# Patient Record
Sex: MALE | Race: BLACK OR AFRICAN AMERICAN | NOT HISPANIC OR LATINO | URBAN - METROPOLITAN AREA
[De-identification: names, ages, dates, MRNs, and addresses within clinical notes are randomized per-mention and may not be internally consistent; named-entity substitution may affect disease eponyms.]

---

## 2017-04-04 ENCOUNTER — OFFICE (OUTPATIENT)
Dept: URBAN - METROPOLITAN AREA CLINIC 14 | Facility: CLINIC | Age: 72
End: 2017-04-04
Payer: MEDICARE

## 2017-04-04 VITALS
HEART RATE: 90 BPM | DIASTOLIC BLOOD PRESSURE: 84 MMHG | HEIGHT: 71 IN | WEIGHT: 170 LBS | SYSTOLIC BLOOD PRESSURE: 122 MMHG

## 2017-04-04 DIAGNOSIS — I10 ESSENTIAL (PRIMARY) HYPERTENSION: ICD-10-CM

## 2017-04-04 DIAGNOSIS — E66.3 OVERWEIGHT: ICD-10-CM

## 2017-04-04 DIAGNOSIS — E11.9 TYPE 2 DIABETES MELLITUS WITHOUT COMPLICATIONS: ICD-10-CM

## 2017-04-04 DIAGNOSIS — R74.8 ABNORMAL LEVELS OF OTHER SERUM ENZYMES: ICD-10-CM

## 2017-04-04 PROCEDURE — 99213 OFFICE O/P EST LOW 20 MIN: CPT | Performed by: INTERNAL MEDICINE

## 2017-04-04 PROCEDURE — G8427 DOCREV CUR MEDS BY ELIG CLIN: HCPCS | Performed by: INTERNAL MEDICINE

## 2017-05-10 ENCOUNTER — ON CAMPUS - OUTPATIENT (OUTPATIENT)
Dept: URBAN - METROPOLITAN AREA HOSPITAL 97 | Facility: HOSPITAL | Age: 72
End: 2017-05-10

## 2017-05-10 DIAGNOSIS — K83.8 OTHER SPECIFIED DISEASES OF BILIARY TRACT: ICD-10-CM

## 2017-05-10 DIAGNOSIS — K83.1 OBSTRUCTION OF BILE DUCT: ICD-10-CM

## 2017-05-10 DIAGNOSIS — K86.1 OTHER CHRONIC PANCREATITIS: ICD-10-CM

## 2017-05-10 DIAGNOSIS — R94.5 ABNORMAL RESULTS OF LIVER FUNCTION STUDIES: ICD-10-CM

## 2017-05-10 PROCEDURE — 43274 ERCP DUCT STENT PLACEMENT: CPT | Performed by: INTERNAL MEDICINE

## 2017-08-01 ENCOUNTER — ON CAMPUS - OUTPATIENT (OUTPATIENT)
Dept: URBAN - METROPOLITAN AREA HOSPITAL 97 | Facility: HOSPITAL | Age: 72
End: 2017-08-01

## 2017-08-01 DIAGNOSIS — K83.8 OTHER SPECIFIED DISEASES OF BILIARY TRACT: ICD-10-CM

## 2017-08-01 DIAGNOSIS — K83.1 OBSTRUCTION OF BILE DUCT: ICD-10-CM

## 2017-08-01 DIAGNOSIS — R94.5 ABNORMAL RESULTS OF LIVER FUNCTION STUDIES: ICD-10-CM

## 2017-08-01 PROCEDURE — 43274 ERCP DUCT STENT PLACEMENT: CPT | Performed by: INTERNAL MEDICINE

## 2017-09-13 ENCOUNTER — OFFICE (OUTPATIENT)
Dept: URBAN - METROPOLITAN AREA CLINIC 19 | Facility: CLINIC | Age: 72
End: 2017-09-13
Payer: MEDICARE

## 2017-09-13 VITALS
SYSTOLIC BLOOD PRESSURE: 124 MMHG | HEIGHT: 71 IN | DIASTOLIC BLOOD PRESSURE: 83 MMHG | WEIGHT: 171 LBS | HEART RATE: 98 BPM

## 2017-09-13 DIAGNOSIS — K86.1 OTHER CHRONIC PANCREATITIS: ICD-10-CM

## 2017-09-13 DIAGNOSIS — K83.1 OBSTRUCTION OF BILE DUCT: ICD-10-CM

## 2017-09-13 LAB
COMP. METABOLIC PANEL (14): A/G RATIO: 1.3 (ref 1.2–2.2)
COMP. METABOLIC PANEL (14): ALBUMIN, SERUM: 4 G/DL (ref 3.5–4.8)
COMP. METABOLIC PANEL (14): ALKALINE PHOSPHATASE, S: 242 IU/L — HIGH (ref 39–117)
COMP. METABOLIC PANEL (14): ALT (SGPT): 33 IU/L (ref 0–44)
COMP. METABOLIC PANEL (14): AST (SGOT): 30 IU/L (ref 0–40)
COMP. METABOLIC PANEL (14): BILIRUBIN, TOTAL: 0.5 MG/DL (ref 0–1.2)
COMP. METABOLIC PANEL (14): BUN/CREATININE RATIO: 15 (ref 10–24)
COMP. METABOLIC PANEL (14): BUN: 15 MG/DL (ref 8–27)
COMP. METABOLIC PANEL (14): CALCIUM, SERUM: 9.3 MG/DL (ref 8.6–10.2)
COMP. METABOLIC PANEL (14): CARBON DIOXIDE, TOTAL: 22 MMOL/L (ref 18–29)
COMP. METABOLIC PANEL (14): CHLORIDE, SERUM: 104 MMOL/L (ref 96–106)
COMP. METABOLIC PANEL (14): CREATININE, SERUM: 0.98 MG/DL (ref 0.76–1.27)
COMP. METABOLIC PANEL (14): EGFR IF AFRICN AM: 89 ML/MIN/1.73 (ref 59–?)
COMP. METABOLIC PANEL (14): EGFR IF NONAFRICN AM: 77 ML/MIN/1.73 (ref 59–?)
COMP. METABOLIC PANEL (14): GLOBULIN, TOTAL: 3.2 G/DL (ref 1.5–4.5)
COMP. METABOLIC PANEL (14): GLUCOSE, SERUM: 120 MG/DL — HIGH (ref 65–99)
COMP. METABOLIC PANEL (14): POTASSIUM, SERUM: 5 MMOL/L (ref 3.5–5.2)
COMP. METABOLIC PANEL (14): PROTEIN, TOTAL, SERUM: 7.2 G/DL (ref 6–8.5)
COMP. METABOLIC PANEL (14): SODIUM, SERUM: 143 MMOL/L (ref 134–144)
IGG, SUBCLASS 4: 819 MG/DL — HIGH (ref 2–96)

## 2017-09-13 PROCEDURE — G8427 DOCREV CUR MEDS BY ELIG CLIN: HCPCS | Performed by: INTERNAL MEDICINE

## 2017-09-13 PROCEDURE — 99212 OFFICE O/P EST SF 10 MIN: CPT | Performed by: INTERNAL MEDICINE

## 2017-10-23 ENCOUNTER — ON CAMPUS - OUTPATIENT (OUTPATIENT)
Dept: URBAN - METROPOLITAN AREA HOSPITAL 97 | Facility: HOSPITAL | Age: 72
End: 2017-10-23

## 2017-10-23 DIAGNOSIS — K83.1 OBSTRUCTION OF BILE DUCT: ICD-10-CM

## 2017-10-23 DIAGNOSIS — R94.5 ABNORMAL RESULTS OF LIVER FUNCTION STUDIES: ICD-10-CM

## 2017-10-23 DIAGNOSIS — K83.8 OTHER SPECIFIED DISEASES OF BILIARY TRACT: ICD-10-CM

## 2017-10-23 PROCEDURE — 43275 ERCP REMOVE FORGN BODY DUCT: CPT | Mod: 51 | Performed by: INTERNAL MEDICINE

## 2017-10-23 PROCEDURE — 43277 ERCP EA DUCT/AMPULLA DILATE: CPT | Performed by: INTERNAL MEDICINE

## 2018-01-24 ENCOUNTER — OFFICE (OUTPATIENT)
Dept: URBAN - METROPOLITAN AREA CLINIC 19 | Facility: CLINIC | Age: 73
End: 2018-01-24

## 2018-01-24 VITALS
SYSTOLIC BLOOD PRESSURE: 143 MMHG | HEART RATE: 93 BPM | HEIGHT: 71 IN | WEIGHT: 178 LBS | DIASTOLIC BLOOD PRESSURE: 88 MMHG

## 2018-01-24 DIAGNOSIS — K85.82 OTHER ACUTE PANCREATITIS WITH INFECTED NECROSIS: ICD-10-CM

## 2018-01-24 LAB
CBC WITH DIFFERENTIAL/PLATELET: BASO (ABSOLUTE): 0 X10E3/UL (ref 0–0.2)
CBC WITH DIFFERENTIAL/PLATELET: BASOS: 0 %
CBC WITH DIFFERENTIAL/PLATELET: EOS (ABSOLUTE): 0.1 X10E3/UL (ref 0–0.4)
CBC WITH DIFFERENTIAL/PLATELET: EOS: 1 %
CBC WITH DIFFERENTIAL/PLATELET: HEMATOCRIT: 46.1 % (ref 37.5–51)
CBC WITH DIFFERENTIAL/PLATELET: HEMOGLOBIN: 15.3 G/DL (ref 13–17.7)
CBC WITH DIFFERENTIAL/PLATELET: IMMATURE GRANS (ABS): 0 X10E3/UL (ref 0–0.1)
CBC WITH DIFFERENTIAL/PLATELET: IMMATURE GRANULOCYTES: 0 %
CBC WITH DIFFERENTIAL/PLATELET: LYMPHS (ABSOLUTE): 1.3 X10E3/UL (ref 0.7–3.1)
CBC WITH DIFFERENTIAL/PLATELET: LYMPHS: 26 %
CBC WITH DIFFERENTIAL/PLATELET: MCH: 31.6 PG (ref 26.6–33)
CBC WITH DIFFERENTIAL/PLATELET: MCHC: 33.2 G/DL (ref 31.5–35.7)
CBC WITH DIFFERENTIAL/PLATELET: MCV: 95 FL (ref 79–97)
CBC WITH DIFFERENTIAL/PLATELET: MONOCYTES(ABSOLUTE): 0.3 X10E3/UL (ref 0.1–0.9)
CBC WITH DIFFERENTIAL/PLATELET: MONOCYTES: 6 %
CBC WITH DIFFERENTIAL/PLATELET: NEUTROPHILS (ABSOLUTE): 3.2 X10E3/UL (ref 1.4–7)
CBC WITH DIFFERENTIAL/PLATELET: NEUTROPHILS: 67 %
CBC WITH DIFFERENTIAL/PLATELET: PLATELETS: 143 X10E3/UL — LOW (ref 150–379)
CBC WITH DIFFERENTIAL/PLATELET: RBC: 4.84 X10E6/UL (ref 4.14–5.8)
CBC WITH DIFFERENTIAL/PLATELET: RDW: 15.4 % (ref 12.3–15.4)
CBC WITH DIFFERENTIAL/PLATELET: WBC: 4.8 X10E3/UL (ref 3.4–10.8)
COMP. METABOLIC PANEL (14): A/G RATIO: 1.3 (ref 1.2–2.2)
COMP. METABOLIC PANEL (14): ALBUMIN, SERUM: 4.1 G/DL (ref 3.5–4.8)
COMP. METABOLIC PANEL (14): ALKALINE PHOSPHATASE, S: 161 IU/L — HIGH (ref 39–117)
COMP. METABOLIC PANEL (14): ALT (SGPT): 13 IU/L (ref 0–44)
COMP. METABOLIC PANEL (14): AST (SGOT): 17 IU/L (ref 0–40)
COMP. METABOLIC PANEL (14): BILIRUBIN, TOTAL: 0.5 MG/DL (ref 0–1.2)
COMP. METABOLIC PANEL (14): BUN/CREATININE RATIO: 13 (ref 10–24)
COMP. METABOLIC PANEL (14): BUN: 15 MG/DL (ref 8–27)
COMP. METABOLIC PANEL (14): CALCIUM, SERUM: 9.1 MG/DL (ref 8.6–10.2)
COMP. METABOLIC PANEL (14): CARBON DIOXIDE, TOTAL: 21 MMOL/L (ref 18–29)
COMP. METABOLIC PANEL (14): CHLORIDE, SERUM: 106 MMOL/L (ref 96–106)
COMP. METABOLIC PANEL (14): CREATININE, SERUM: 1.14 MG/DL (ref 0.76–1.27)
COMP. METABOLIC PANEL (14): EGFR IF AFRICN AM: 74 ML/MIN/1.73 (ref 59–?)
COMP. METABOLIC PANEL (14): EGFR IF NONAFRICN AM: 64 ML/MIN/1.73 (ref 59–?)
COMP. METABOLIC PANEL (14): GLOBULIN, TOTAL: 3.2 G/DL (ref 1.5–4.5)
COMP. METABOLIC PANEL (14): GLUCOSE, SERUM: 125 MG/DL — HIGH (ref 65–99)
COMP. METABOLIC PANEL (14): POTASSIUM, SERUM: 4.9 MMOL/L (ref 3.5–5.2)
COMP. METABOLIC PANEL (14): PROTEIN, TOTAL, SERUM: 7.3 G/DL (ref 6–8.5)
COMP. METABOLIC PANEL (14): SODIUM, SERUM: 143 MMOL/L (ref 134–144)

## 2018-01-24 PROCEDURE — 99212 OFFICE O/P EST SF 10 MIN: CPT | Performed by: INTERNAL MEDICINE

## 2018-01-24 RX ORDER — AZATHIOPRINE 50 1/1
TABLET ORAL
Qty: 180 | Refills: 0 | Status: COMPLETED
End: 2023-11-15

## 2018-07-25 ENCOUNTER — OFFICE (OUTPATIENT)
Dept: URBAN - METROPOLITAN AREA CLINIC 19 | Facility: CLINIC | Age: 73
End: 2018-07-25

## 2018-07-25 VITALS
HEIGHT: 71 IN | DIASTOLIC BLOOD PRESSURE: 86 MMHG | HEART RATE: 101 BPM | WEIGHT: 176 LBS | SYSTOLIC BLOOD PRESSURE: 123 MMHG

## 2018-07-25 DIAGNOSIS — K85.82 OTHER ACUTE PANCREATITIS WITH INFECTED NECROSIS: ICD-10-CM

## 2018-07-25 LAB
C-REACTIVE PROTEIN, QUANT: 2.3 MG/L (ref 0–4.9)
CBC WITH DIFFERENTIAL/PLATELET: BASO (ABSOLUTE): 0 X10E3/UL (ref 0–0.2)
CBC WITH DIFFERENTIAL/PLATELET: BASOS: 0 %
CBC WITH DIFFERENTIAL/PLATELET: EOS (ABSOLUTE): 0.1 X10E3/UL (ref 0–0.4)
CBC WITH DIFFERENTIAL/PLATELET: EOS: 1 %
CBC WITH DIFFERENTIAL/PLATELET: HEMATOCRIT: 42.6 % (ref 37.5–51)
CBC WITH DIFFERENTIAL/PLATELET: HEMOGLOBIN: 14.5 G/DL (ref 13–17.7)
CBC WITH DIFFERENTIAL/PLATELET: IMMATURE GRANS (ABS): 0 X10E3/UL (ref 0–0.1)
CBC WITH DIFFERENTIAL/PLATELET: IMMATURE GRANULOCYTES: 0 %
CBC WITH DIFFERENTIAL/PLATELET: LYMPHS (ABSOLUTE): 1.4 X10E3/UL (ref 0.7–3.1)
CBC WITH DIFFERENTIAL/PLATELET: LYMPHS: 30 %
CBC WITH DIFFERENTIAL/PLATELET: MCH: 32.6 PG (ref 26.6–33)
CBC WITH DIFFERENTIAL/PLATELET: MCHC: 34 G/DL (ref 31.5–35.7)
CBC WITH DIFFERENTIAL/PLATELET: MCV: 96 FL (ref 79–97)
CBC WITH DIFFERENTIAL/PLATELET: MONOCYTES(ABSOLUTE): 0.4 X10E3/UL (ref 0.1–0.9)
CBC WITH DIFFERENTIAL/PLATELET: MONOCYTES: 8 %
CBC WITH DIFFERENTIAL/PLATELET: NEUTROPHILS (ABSOLUTE): 2.7 X10E3/UL (ref 1.4–7)
CBC WITH DIFFERENTIAL/PLATELET: NEUTROPHILS: 61 %
CBC WITH DIFFERENTIAL/PLATELET: PLATELETS: 147 X10E3/UL — LOW (ref 150–379)
CBC WITH DIFFERENTIAL/PLATELET: RBC: 4.45 X10E6/UL (ref 4.14–5.8)
CBC WITH DIFFERENTIAL/PLATELET: RDW: 15.9 % — HIGH (ref 12.3–15.4)
CBC WITH DIFFERENTIAL/PLATELET: WBC: 4.5 X10E3/UL (ref 3.4–10.8)
COMP. METABOLIC PANEL (14): A/G RATIO: 1.6 (ref 1.2–2.2)
COMP. METABOLIC PANEL (14): ALBUMIN: 3.9 G/DL (ref 3.5–4.8)
COMP. METABOLIC PANEL (14): ALKALINE PHOSPHATASE: 109 IU/L (ref 39–117)
COMP. METABOLIC PANEL (14): ALT (SGPT): 10 IU/L (ref 0–44)
COMP. METABOLIC PANEL (14): AST (SGOT): 13 IU/L (ref 0–40)
COMP. METABOLIC PANEL (14): BILIRUBIN, TOTAL: 0.3 MG/DL (ref 0–1.2)
COMP. METABOLIC PANEL (14): BUN/CREATININE RATIO: 14 (ref 10–24)
COMP. METABOLIC PANEL (14): BUN: 15 MG/DL (ref 8–27)
COMP. METABOLIC PANEL (14): CALCIUM: 8.8 MG/DL (ref 8.6–10.2)
COMP. METABOLIC PANEL (14): CARBON DIOXIDE, TOTAL: 22 MMOL/L (ref 20–29)
COMP. METABOLIC PANEL (14): CHLORIDE: 108 MMOL/L — HIGH (ref 96–106)
COMP. METABOLIC PANEL (14): CREATININE: 1.11 MG/DL (ref 0.76–1.27)
COMP. METABOLIC PANEL (14): EGFR IF AFRICN AM: 76 ML/MIN/1.73 (ref 59–?)
COMP. METABOLIC PANEL (14): EGFR IF NONAFRICN AM: 66 ML/MIN/1.73 (ref 59–?)
COMP. METABOLIC PANEL (14): GLOBULIN, TOTAL: 2.4 G/DL (ref 1.5–4.5)
COMP. METABOLIC PANEL (14): GLUCOSE: 149 MG/DL — HIGH (ref 65–99)
COMP. METABOLIC PANEL (14): POTASSIUM: 4.3 MMOL/L (ref 3.5–5.2)
COMP. METABOLIC PANEL (14): PROTEIN, TOTAL: 6.3 G/DL (ref 6–8.5)
COMP. METABOLIC PANEL (14): SODIUM: 145 MMOL/L — HIGH (ref 134–144)
LIPASE: 48 U/L (ref 13–78)

## 2018-07-25 PROCEDURE — 99212 OFFICE O/P EST SF 10 MIN: CPT | Performed by: INTERNAL MEDICINE

## 2018-07-25 RX ORDER — AZATHIOPRINE 50 1/1
TABLET ORAL
Qty: 180 | Refills: 0 | Status: COMPLETED
End: 2023-11-15

## 2019-02-08 ENCOUNTER — OFFICE (OUTPATIENT)
Dept: URBAN - METROPOLITAN AREA CLINIC 19 | Facility: CLINIC | Age: 74
End: 2019-02-08

## 2019-02-08 VITALS
HEART RATE: 99 BPM | DIASTOLIC BLOOD PRESSURE: 95 MMHG | WEIGHT: 182 LBS | SYSTOLIC BLOOD PRESSURE: 135 MMHG | HEIGHT: 71 IN

## 2019-02-08 DIAGNOSIS — K85.82 OTHER ACUTE PANCREATITIS WITH INFECTED NECROSIS: ICD-10-CM

## 2019-02-08 LAB
CBC WITH DIFFERENTIAL/PLATELET: BASO (ABSOLUTE): 0 X10E3/UL (ref 0–0.2)
CBC WITH DIFFERENTIAL/PLATELET: BASOS: 0 %
CBC WITH DIFFERENTIAL/PLATELET: EOS (ABSOLUTE): 0.1 X10E3/UL (ref 0–0.4)
CBC WITH DIFFERENTIAL/PLATELET: EOS: 1 %
CBC WITH DIFFERENTIAL/PLATELET: HEMATOCRIT: 44.9 % (ref 37.5–51)
CBC WITH DIFFERENTIAL/PLATELET: HEMOGLOBIN: 15 G/DL (ref 13–17.7)
CBC WITH DIFFERENTIAL/PLATELET: IMMATURE GRANS (ABS): 0 X10E3/UL (ref 0–0.1)
CBC WITH DIFFERENTIAL/PLATELET: IMMATURE GRANULOCYTES: 0 %
CBC WITH DIFFERENTIAL/PLATELET: LYMPHS (ABSOLUTE): 0.9 X10E3/UL (ref 0.7–3.1)
CBC WITH DIFFERENTIAL/PLATELET: LYMPHS: 16 %
CBC WITH DIFFERENTIAL/PLATELET: MCH: 32.1 PG (ref 26.6–33)
CBC WITH DIFFERENTIAL/PLATELET: MCHC: 33.4 G/DL (ref 31.5–35.7)
CBC WITH DIFFERENTIAL/PLATELET: MCV: 96 FL (ref 79–97)
CBC WITH DIFFERENTIAL/PLATELET: MONOCYTES(ABSOLUTE): 0.4 X10E3/UL (ref 0.1–0.9)
CBC WITH DIFFERENTIAL/PLATELET: MONOCYTES: 7 %
CBC WITH DIFFERENTIAL/PLATELET: NEUTROPHILS (ABSOLUTE): 4.3 X10E3/UL (ref 1.4–7)
CBC WITH DIFFERENTIAL/PLATELET: NEUTROPHILS: 76 %
CBC WITH DIFFERENTIAL/PLATELET: PLATELETS: 151 X10E3/UL (ref 150–379)
CBC WITH DIFFERENTIAL/PLATELET: RBC: 4.67 X10E6/UL (ref 4.14–5.8)
CBC WITH DIFFERENTIAL/PLATELET: RDW: 15 % (ref 12.3–15.4)
CBC WITH DIFFERENTIAL/PLATELET: WBC: 5.7 X10E3/UL (ref 3.4–10.8)
COMP. METABOLIC PANEL (14): A/G RATIO: 1.4 (ref 1.2–2.2)
COMP. METABOLIC PANEL (14): ALBUMIN: 4.1 G/DL (ref 3.5–4.8)
COMP. METABOLIC PANEL (14): ALKALINE PHOSPHATASE: 109 IU/L (ref 39–117)
COMP. METABOLIC PANEL (14): ALT (SGPT): 6 IU/L (ref 0–44)
COMP. METABOLIC PANEL (14): AST (SGOT): 12 IU/L (ref 0–40)
COMP. METABOLIC PANEL (14): BILIRUBIN, TOTAL: 0.4 MG/DL (ref 0–1.2)
COMP. METABOLIC PANEL (14): BUN/CREATININE RATIO: 14 (ref 10–24)
COMP. METABOLIC PANEL (14): BUN: 15 MG/DL (ref 8–27)
COMP. METABOLIC PANEL (14): CALCIUM: 8.8 MG/DL (ref 8.6–10.2)
COMP. METABOLIC PANEL (14): CARBON DIOXIDE, TOTAL: 23 MMOL/L (ref 20–29)
COMP. METABOLIC PANEL (14): CHLORIDE: 106 MMOL/L (ref 96–106)
COMP. METABOLIC PANEL (14): CREATININE: 1.08 MG/DL (ref 0.76–1.27)
COMP. METABOLIC PANEL (14): EGFR IF AFRICN AM: 79 ML/MIN/1.73 (ref 59–?)
COMP. METABOLIC PANEL (14): EGFR IF NONAFRICN AM: 68 ML/MIN/1.73 (ref 59–?)
COMP. METABOLIC PANEL (14): GLOBULIN, TOTAL: 2.9 G/DL (ref 1.5–4.5)
COMP. METABOLIC PANEL (14): GLUCOSE: 160 MG/DL — HIGH (ref 65–99)
COMP. METABOLIC PANEL (14): POTASSIUM: 4.5 MMOL/L (ref 3.5–5.2)
COMP. METABOLIC PANEL (14): PROTEIN, TOTAL: 7 G/DL (ref 6–8.5)
COMP. METABOLIC PANEL (14): SODIUM: 144 MMOL/L (ref 134–144)
LIPASE: 57 U/L (ref 13–78)

## 2019-02-08 PROCEDURE — 99212 OFFICE O/P EST SF 10 MIN: CPT | Performed by: INTERNAL MEDICINE

## 2019-02-08 RX ORDER — AZATHIOPRINE 50 1/1
TABLET ORAL
Qty: 180 | Refills: 0 | Status: COMPLETED
End: 2023-11-15

## 2019-08-12 ENCOUNTER — OFFICE (OUTPATIENT)
Dept: URBAN - METROPOLITAN AREA CLINIC 19 | Facility: CLINIC | Age: 74
End: 2019-08-12

## 2019-08-12 VITALS
WEIGHT: 175 LBS | DIASTOLIC BLOOD PRESSURE: 68 MMHG | HEART RATE: 91 BPM | SYSTOLIC BLOOD PRESSURE: 123 MMHG | HEIGHT: 71 IN

## 2019-08-12 DIAGNOSIS — K85.80 OTHER ACUTE PANCREATITIS WITHOUT NECROSIS OR INFECTION: ICD-10-CM

## 2019-08-12 PROCEDURE — 99213 OFFICE O/P EST LOW 20 MIN: CPT | Performed by: INTERNAL MEDICINE

## 2020-02-12 ENCOUNTER — OFFICE (OUTPATIENT)
Dept: URBAN - METROPOLITAN AREA CLINIC 19 | Facility: CLINIC | Age: 75
End: 2020-02-12

## 2020-02-12 VITALS
WEIGHT: 178 LBS | HEART RATE: 101 BPM | HEIGHT: 71 IN | SYSTOLIC BLOOD PRESSURE: 141 MMHG | DIASTOLIC BLOOD PRESSURE: 85 MMHG

## 2020-02-12 DIAGNOSIS — K85.82 OTHER ACUTE PANCREATITIS WITH INFECTED NECROSIS: ICD-10-CM

## 2020-02-12 LAB
C-REACTIVE PROTEIN, QUANT: 1 MG/L (ref 0–10)
CBC WITH DIFFERENTIAL/PLATELET: BASO (ABSOLUTE): 0 X10E3/UL (ref 0–0.2)
CBC WITH DIFFERENTIAL/PLATELET: BASOS: 0 %
CBC WITH DIFFERENTIAL/PLATELET: EOS (ABSOLUTE): 0 X10E3/UL (ref 0–0.4)
CBC WITH DIFFERENTIAL/PLATELET: EOS: 1 %
CBC WITH DIFFERENTIAL/PLATELET: HEMATOCRIT: 45.6 % (ref 37.5–51)
CBC WITH DIFFERENTIAL/PLATELET: HEMOGLOBIN: 14.8 G/DL (ref 13–17.7)
CBC WITH DIFFERENTIAL/PLATELET: IMMATURE GRANS (ABS): 0 X10E3/UL (ref 0–0.1)
CBC WITH DIFFERENTIAL/PLATELET: IMMATURE GRANULOCYTES: 0 %
CBC WITH DIFFERENTIAL/PLATELET: LYMPHS (ABSOLUTE): 1.1 X10E3/UL (ref 0.7–3.1)
CBC WITH DIFFERENTIAL/PLATELET: LYMPHS: 26 %
CBC WITH DIFFERENTIAL/PLATELET: MCH: 32 PG (ref 26.6–33)
CBC WITH DIFFERENTIAL/PLATELET: MCHC: 32.5 G/DL (ref 31.5–35.7)
CBC WITH DIFFERENTIAL/PLATELET: MCV: 99 FL — HIGH (ref 79–97)
CBC WITH DIFFERENTIAL/PLATELET: MONOCYTES(ABSOLUTE): 0.3 X10E3/UL (ref 0.1–0.9)
CBC WITH DIFFERENTIAL/PLATELET: MONOCYTES: 6 %
CBC WITH DIFFERENTIAL/PLATELET: NEUTROPHILS (ABSOLUTE): 3 X10E3/UL (ref 1.4–7)
CBC WITH DIFFERENTIAL/PLATELET: NEUTROPHILS: 67 %
CBC WITH DIFFERENTIAL/PLATELET: PLATELETS: 172 X10E3/UL (ref 150–450)
CBC WITH DIFFERENTIAL/PLATELET: RBC: 4.63 X10E6/UL (ref 4.14–5.8)
CBC WITH DIFFERENTIAL/PLATELET: RDW: 14.5 % (ref 11.6–15.4)
CBC WITH DIFFERENTIAL/PLATELET: WBC: 4.4 X10E3/UL (ref 3.4–10.8)
COMP. METABOLIC PANEL (14): A/G RATIO: 1.4 (ref 1.2–2.2)
COMP. METABOLIC PANEL (14): ALBUMIN: 4.1 G/DL (ref 3.7–4.7)
COMP. METABOLIC PANEL (14): ALKALINE PHOSPHATASE: 102 IU/L (ref 39–117)
COMP. METABOLIC PANEL (14): ALT (SGPT): 9 IU/L (ref 0–44)
COMP. METABOLIC PANEL (14): AST (SGOT): 14 IU/L (ref 0–40)
COMP. METABOLIC PANEL (14): BILIRUBIN, TOTAL: 0.4 MG/DL (ref 0–1.2)
COMP. METABOLIC PANEL (14): BUN/CREATININE RATIO: 11 (ref 10–24)
COMP. METABOLIC PANEL (14): BUN: 13 MG/DL (ref 8–27)
COMP. METABOLIC PANEL (14): CALCIUM: 8.8 MG/DL (ref 8.6–10.2)
COMP. METABOLIC PANEL (14): CARBON DIOXIDE, TOTAL: 22 MMOL/L (ref 20–29)
COMP. METABOLIC PANEL (14): CHLORIDE: 108 MMOL/L — HIGH (ref 96–106)
COMP. METABOLIC PANEL (14): CREATININE: 1.21 MG/DL (ref 0.76–1.27)
COMP. METABOLIC PANEL (14): EGFR IF AFRICN AM: 68 ML/MIN/1.73 (ref 59–?)
COMP. METABOLIC PANEL (14): EGFR IF NONAFRICN AM: 59 ML/MIN/1.73 — LOW (ref 59–?)
COMP. METABOLIC PANEL (14): GLOBULIN, TOTAL: 3 G/DL (ref 1.5–4.5)
COMP. METABOLIC PANEL (14): GLUCOSE: 117 MG/DL — HIGH (ref 65–99)
COMP. METABOLIC PANEL (14): POTASSIUM: 4.9 MMOL/L (ref 3.5–5.2)
COMP. METABOLIC PANEL (14): PROTEIN, TOTAL: 7.1 G/DL (ref 6–8.5)
COMP. METABOLIC PANEL (14): SODIUM: 142 MMOL/L (ref 134–144)
LIPASE: 50 U/L (ref 13–78)

## 2020-02-12 PROCEDURE — 99213 OFFICE O/P EST LOW 20 MIN: CPT

## 2020-02-12 RX ORDER — AZATHIOPRINE 50 1/1
TABLET ORAL
Qty: 180 | Refills: 0 | Status: COMPLETED
End: 2023-11-15

## 2020-08-19 ENCOUNTER — OFFICE (OUTPATIENT)
Dept: URBAN - METROPOLITAN AREA CLINIC 19 | Facility: CLINIC | Age: 75
End: 2020-08-19

## 2020-08-19 VITALS
SYSTOLIC BLOOD PRESSURE: 141 MMHG | WEIGHT: 174 LBS | OXYGEN SATURATION: 91 % | DIASTOLIC BLOOD PRESSURE: 89 MMHG | HEIGHT: 71 IN | HEART RATE: 93 BPM

## 2020-08-19 DIAGNOSIS — K85.82 OTHER ACUTE PANCREATITIS WITH INFECTED NECROSIS: ICD-10-CM

## 2020-08-19 PROCEDURE — 99212 OFFICE O/P EST SF 10 MIN: CPT | Performed by: INTERNAL MEDICINE

## 2020-08-19 RX ORDER — AZATHIOPRINE 50 1/1
TABLET ORAL
Qty: 180 | Refills: 0 | Status: COMPLETED
End: 2023-11-15

## 2020-10-20 ENCOUNTER — OFFICE (OUTPATIENT)
Dept: URBAN - METROPOLITAN AREA CLINIC 14 | Facility: CLINIC | Age: 75
End: 2020-10-20

## 2020-10-20 VITALS
WEIGHT: 172 LBS | HEIGHT: 71 IN | OXYGEN SATURATION: 96 % | DIASTOLIC BLOOD PRESSURE: 74 MMHG | HEART RATE: 94 BPM | SYSTOLIC BLOOD PRESSURE: 115 MMHG | RESPIRATION RATE: 16 BRPM

## 2020-10-20 DIAGNOSIS — K85.82 OTHER ACUTE PANCREATITIS WITH INFECTED NECROSIS: ICD-10-CM

## 2020-10-20 DIAGNOSIS — K83.1 OBSTRUCTION OF BILE DUCT: ICD-10-CM

## 2020-10-20 DIAGNOSIS — R11.0 NAUSEA: ICD-10-CM

## 2020-10-20 DIAGNOSIS — K86.1 OTHER CHRONIC PANCREATITIS: ICD-10-CM

## 2020-10-20 DIAGNOSIS — R10.9 UNSPECIFIED ABDOMINAL PAIN: ICD-10-CM

## 2020-10-20 LAB
C-REACTIVE PROTEIN, QUANT: <1 MG/L
CBC, PLATELET, NO DIFFERENTIAL: HEMATOCRIT: 42.6 % (ref 37.5–51)
CBC, PLATELET, NO DIFFERENTIAL: HEMOGLOBIN: 14.3 G/DL (ref 13–17.7)
CBC, PLATELET, NO DIFFERENTIAL: MCH: 32.5 PG (ref 26.6–33)
CBC, PLATELET, NO DIFFERENTIAL: MCHC: 33.6 G/DL (ref 31.5–35.7)
CBC, PLATELET, NO DIFFERENTIAL: MCV: 97 FL (ref 79–97)
CBC, PLATELET, NO DIFFERENTIAL: PLATELETS: 153 X10E3/UL (ref 150–450)
CBC, PLATELET, NO DIFFERENTIAL: RBC: 4.4 X10E6/UL (ref 4.14–5.8)
CBC, PLATELET, NO DIFFERENTIAL: RDW: 13.7 % (ref 11.6–15.4)
CBC, PLATELET, NO DIFFERENTIAL: WBC: 4.5 X10E3/UL (ref 3.4–10.8)
COMP. METABOLIC PANEL (14): A/G RATIO: 1.5 (ref 1.2–2.2)
COMP. METABOLIC PANEL (14): ALBUMIN: 4.1 G/DL (ref 3.7–4.7)
COMP. METABOLIC PANEL (14): ALKALINE PHOSPHATASE: 100 IU/L (ref 39–117)
COMP. METABOLIC PANEL (14): ALT (SGPT): 7 IU/L (ref 0–44)
COMP. METABOLIC PANEL (14): AST (SGOT): 11 IU/L (ref 0–40)
COMP. METABOLIC PANEL (14): BILIRUBIN, TOTAL: 0.4 MG/DL (ref 0–1.2)
COMP. METABOLIC PANEL (14): BUN/CREATININE RATIO: 19 (ref 10–24)
COMP. METABOLIC PANEL (14): BUN: 18 MG/DL (ref 8–27)
COMP. METABOLIC PANEL (14): CALCIUM: 8.6 MG/DL (ref 8.6–10.2)
COMP. METABOLIC PANEL (14): CARBON DIOXIDE, TOTAL: 23 MMOL/L (ref 20–29)
COMP. METABOLIC PANEL (14): CHLORIDE: 110 MMOL/L — HIGH (ref 96–106)
COMP. METABOLIC PANEL (14): CREATININE: 0.96 MG/DL (ref 0.76–1.27)
COMP. METABOLIC PANEL (14): EGFR IF AFRICN AM: 90 ML/MIN/1.73 (ref 59–?)
COMP. METABOLIC PANEL (14): EGFR IF NONAFRICN AM: 78 ML/MIN/1.73 (ref 59–?)
COMP. METABOLIC PANEL (14): GLOBULIN, TOTAL: 2.8 G/DL (ref 1.5–4.5)
COMP. METABOLIC PANEL (14): GLUCOSE: 100 MG/DL — HIGH (ref 65–99)
COMP. METABOLIC PANEL (14): POTASSIUM: 4.2 MMOL/L (ref 3.5–5.2)
COMP. METABOLIC PANEL (14): PROTEIN, TOTAL: 6.9 G/DL (ref 6–8.5)
COMP. METABOLIC PANEL (14): SODIUM: 142 MMOL/L (ref 134–144)
LIPASE: 53 U/L (ref 13–78)

## 2020-10-20 PROCEDURE — 99214 OFFICE O/P EST MOD 30 MIN: CPT

## 2020-10-20 RX ORDER — ONDANSETRON 8 MG/1
TABLET, ORALLY DISINTEGRATING ORAL
Qty: 30 | Refills: 0 | Status: COMPLETED
Start: 2020-10-20 | End: 2021-08-24

## 2020-10-21 LAB
C-REACTIVE PROTEIN, QUANT: <1 MG/L
CBC, PLATELET, NO DIFFERENTIAL: HEMATOCRIT: 42.6 % (ref 37.5–51)
CBC, PLATELET, NO DIFFERENTIAL: HEMOGLOBIN: 14.3 G/DL (ref 13–17.7)
CBC, PLATELET, NO DIFFERENTIAL: MCH: 32.5 PG (ref 26.6–33)
CBC, PLATELET, NO DIFFERENTIAL: MCHC: 33.6 G/DL (ref 31.5–35.7)
CBC, PLATELET, NO DIFFERENTIAL: MCV: 97 FL (ref 79–97)
CBC, PLATELET, NO DIFFERENTIAL: NRBC: (no result)
CBC, PLATELET, NO DIFFERENTIAL: PLATELETS: 153 X10E3/UL (ref 150–450)
CBC, PLATELET, NO DIFFERENTIAL: RBC: 4.4 X10E6/UL (ref 4.14–5.8)
CBC, PLATELET, NO DIFFERENTIAL: RDW: 13.7 % (ref 11.6–15.4)
CBC, PLATELET, NO DIFFERENTIAL: WBC: 4.5 X10E3/UL (ref 3.4–10.8)
COMP. METABOLIC PANEL (14): A/G RATIO: 1.5 (ref 1.2–2.2)
COMP. METABOLIC PANEL (14): ALBUMIN: 4.1 G/DL (ref 3.7–4.7)
COMP. METABOLIC PANEL (14): ALKALINE PHOSPHATASE: 100 IU/L (ref 39–117)
COMP. METABOLIC PANEL (14): ALT (SGPT): 7 IU/L (ref 0–44)
COMP. METABOLIC PANEL (14): AST (SGOT): 11 IU/L (ref 0–40)
COMP. METABOLIC PANEL (14): BILIRUBIN, TOTAL: 0.4 MG/DL (ref 0–1.2)
COMP. METABOLIC PANEL (14): BUN/CREATININE RATIO: 19 (ref 10–24)
COMP. METABOLIC PANEL (14): BUN: 18 MG/DL (ref 8–27)
COMP. METABOLIC PANEL (14): CALCIUM: 8.6 MG/DL (ref 8.6–10.2)
COMP. METABOLIC PANEL (14): CARBON DIOXIDE, TOTAL: 23 MMOL/L (ref 20–29)
COMP. METABOLIC PANEL (14): CHLORIDE: 110 MMOL/L — HIGH (ref 96–106)
COMP. METABOLIC PANEL (14): CREATININE: 0.96 MG/DL (ref 0.76–1.27)
COMP. METABOLIC PANEL (14): EGFR IF AFRICN AM: 90 ML/MIN/1.73 (ref 59–?)
COMP. METABOLIC PANEL (14): EGFR IF NONAFRICN AM: 78 ML/MIN/1.73 (ref 59–?)
COMP. METABOLIC PANEL (14): GLOBULIN, TOTAL: 2.8 G/DL (ref 1.5–4.5)
COMP. METABOLIC PANEL (14): GLUCOSE: 100 MG/DL — HIGH (ref 65–99)
COMP. METABOLIC PANEL (14): POTASSIUM: 4.2 MMOL/L (ref 3.5–5.2)
COMP. METABOLIC PANEL (14): PROTEIN, TOTAL: 6.9 G/DL (ref 6–8.5)
COMP. METABOLIC PANEL (14): SODIUM: 142 MMOL/L (ref 134–144)
LIPASE: 53 U/L (ref 13–78)

## 2020-11-24 ENCOUNTER — ON CAMPUS - OUTPATIENT (OUTPATIENT)
Dept: URBAN - METROPOLITAN AREA HOSPITAL 97 | Facility: HOSPITAL | Age: 75
End: 2020-11-24

## 2020-11-24 DIAGNOSIS — K86.89 OTHER SPECIFIED DISEASES OF PANCREAS: ICD-10-CM

## 2020-11-24 DIAGNOSIS — K86.1 OTHER CHRONIC PANCREATITIS: ICD-10-CM

## 2020-11-24 DIAGNOSIS — K86.2 CYST OF PANCREAS: ICD-10-CM

## 2020-11-24 PROCEDURE — 43259 EGD US EXAM DUODENUM/JEJUNUM: CPT | Performed by: INTERNAL MEDICINE

## 2020-12-15 ENCOUNTER — OFFICE (OUTPATIENT)
Dept: URBAN - METROPOLITAN AREA CLINIC 14 | Facility: CLINIC | Age: 75
End: 2020-12-15

## 2020-12-15 VITALS
WEIGHT: 174 LBS | DIASTOLIC BLOOD PRESSURE: 91 MMHG | HEART RATE: 87 BPM | OXYGEN SATURATION: 98 % | SYSTOLIC BLOOD PRESSURE: 139 MMHG | HEIGHT: 71 IN

## 2020-12-15 DIAGNOSIS — R93.3 ABNORMAL FINDINGS ON DIAGNOSTIC IMAGING OF OTHER PARTS OF DI: ICD-10-CM

## 2020-12-15 PROCEDURE — 99213 OFFICE O/P EST LOW 20 MIN: CPT | Performed by: INTERNAL MEDICINE

## 2020-12-15 RX ORDER — AZATHIOPRINE 50 1/1
TABLET ORAL
Qty: 180 | Refills: 0 | Status: COMPLETED
End: 2023-11-15

## 2021-03-15 ENCOUNTER — OFFICE (OUTPATIENT)
Dept: URBAN - METROPOLITAN AREA CLINIC 19 | Facility: CLINIC | Age: 76
End: 2021-03-15

## 2021-03-15 VITALS
OXYGEN SATURATION: 99 % | HEIGHT: 71 IN | HEART RATE: 96 BPM | DIASTOLIC BLOOD PRESSURE: 76 MMHG | WEIGHT: 166 LBS | SYSTOLIC BLOOD PRESSURE: 107 MMHG

## 2021-03-15 DIAGNOSIS — K86.1 OTHER CHRONIC PANCREATITIS: ICD-10-CM

## 2021-03-15 DIAGNOSIS — K85.82 OTHER ACUTE PANCREATITIS WITH INFECTED NECROSIS: ICD-10-CM

## 2021-03-15 DIAGNOSIS — R10.9 UNSPECIFIED ABDOMINAL PAIN: ICD-10-CM

## 2021-03-15 LAB
CBC WITH DIFFERENTIAL/PLATELET: BASO (ABSOLUTE): 0 X10E3/UL (ref 0–0.2)
CBC WITH DIFFERENTIAL/PLATELET: BASOS: 0 %
CBC WITH DIFFERENTIAL/PLATELET: EOS (ABSOLUTE): 0 X10E3/UL (ref 0–0.4)
CBC WITH DIFFERENTIAL/PLATELET: EOS: 1 %
CBC WITH DIFFERENTIAL/PLATELET: HEMATOCRIT: 42.1 % (ref 37.5–51)
CBC WITH DIFFERENTIAL/PLATELET: HEMOGLOBIN: 14.5 G/DL (ref 13–17.7)
CBC WITH DIFFERENTIAL/PLATELET: IMMATURE GRANS (ABS): 0 X10E3/UL (ref 0–0.1)
CBC WITH DIFFERENTIAL/PLATELET: IMMATURE GRANULOCYTES: 0 %
CBC WITH DIFFERENTIAL/PLATELET: LYMPHS (ABSOLUTE): 1.1 X10E3/UL (ref 0.7–3.1)
CBC WITH DIFFERENTIAL/PLATELET: LYMPHS: 23 %
CBC WITH DIFFERENTIAL/PLATELET: MCH: 33.5 PG — HIGH (ref 26.6–33)
CBC WITH DIFFERENTIAL/PLATELET: MCHC: 34.4 G/DL (ref 31.5–35.7)
CBC WITH DIFFERENTIAL/PLATELET: MCV: 97 FL (ref 79–97)
CBC WITH DIFFERENTIAL/PLATELET: MONOCYTES(ABSOLUTE): 0.3 X10E3/UL (ref 0.1–0.9)
CBC WITH DIFFERENTIAL/PLATELET: MONOCYTES: 7 %
CBC WITH DIFFERENTIAL/PLATELET: NEUTROPHILS (ABSOLUTE): 3.3 X10E3/UL (ref 1.4–7)
CBC WITH DIFFERENTIAL/PLATELET: NEUTROPHILS: 69 %
CBC WITH DIFFERENTIAL/PLATELET: PLATELETS: 162 X10E3/UL (ref 150–450)
CBC WITH DIFFERENTIAL/PLATELET: RBC: 4.33 X10E6/UL (ref 4.14–5.8)
CBC WITH DIFFERENTIAL/PLATELET: RDW: 13.9 % (ref 11.6–15.4)
CBC WITH DIFFERENTIAL/PLATELET: WBC: 4.8 X10E3/UL (ref 3.4–10.8)
COMP. METABOLIC PANEL (14): A/G RATIO: 1.3 (ref 1.2–2.2)
COMP. METABOLIC PANEL (14): ALBUMIN: 3.9 G/DL (ref 3.7–4.7)
COMP. METABOLIC PANEL (14): ALKALINE PHOSPHATASE: 97 IU/L (ref 39–117)
COMP. METABOLIC PANEL (14): ALT (SGPT): 12 IU/L (ref 0–44)
COMP. METABOLIC PANEL (14): AST (SGOT): 15 IU/L (ref 0–40)
COMP. METABOLIC PANEL (14): BILIRUBIN, TOTAL: 0.4 MG/DL (ref 0–1.2)
COMP. METABOLIC PANEL (14): BUN/CREATININE RATIO: 16 (ref 10–24)
COMP. METABOLIC PANEL (14): BUN: 13 MG/DL (ref 8–27)
COMP. METABOLIC PANEL (14): CALCIUM: 8.8 MG/DL (ref 8.6–10.2)
COMP. METABOLIC PANEL (14): CARBON DIOXIDE, TOTAL: 21 MMOL/L (ref 20–29)
COMP. METABOLIC PANEL (14): CHLORIDE: 108 MMOL/L — HIGH (ref 96–106)
COMP. METABOLIC PANEL (14): CREATININE: 0.83 MG/DL (ref 0.76–1.27)
COMP. METABOLIC PANEL (14): EGFR IF AFRICN AM: 100 ML/MIN/1.73 (ref 59–?)
COMP. METABOLIC PANEL (14): EGFR IF NONAFRICN AM: 87 ML/MIN/1.73 (ref 59–?)
COMP. METABOLIC PANEL (14): GLOBULIN, TOTAL: 2.9 G/DL (ref 1.5–4.5)
COMP. METABOLIC PANEL (14): GLUCOSE: 76 MG/DL (ref 65–99)
COMP. METABOLIC PANEL (14): POTASSIUM: 4.2 MMOL/L (ref 3.5–5.2)
COMP. METABOLIC PANEL (14): PROTEIN, TOTAL: 6.8 G/DL (ref 6–8.5)
COMP. METABOLIC PANEL (14): SODIUM: 143 MMOL/L (ref 134–144)
LIPASE: 72 U/L (ref 13–78)

## 2021-03-15 PROCEDURE — 99213 OFFICE O/P EST LOW 20 MIN: CPT | Performed by: INTERNAL MEDICINE

## 2021-03-30 ENCOUNTER — OFFICE (OUTPATIENT)
Dept: URBAN - METROPOLITAN AREA CLINIC 19 | Facility: CLINIC | Age: 76
End: 2021-03-30
Payer: COMMERCIAL

## 2021-03-30 DIAGNOSIS — K86.1 OTHER CHRONIC PANCREATITIS: ICD-10-CM

## 2021-03-30 PROCEDURE — 76700 US EXAM ABDOM COMPLETE: CPT | Mod: TC | Performed by: INTERNAL MEDICINE

## 2021-05-26 ENCOUNTER — OFFICE (OUTPATIENT)
Dept: URBAN - METROPOLITAN AREA CLINIC 19 | Facility: CLINIC | Age: 76
End: 2021-05-26

## 2021-05-26 VITALS
HEIGHT: 71 IN | HEART RATE: 88 BPM | DIASTOLIC BLOOD PRESSURE: 92 MMHG | SYSTOLIC BLOOD PRESSURE: 142 MMHG | WEIGHT: 159 LBS | OXYGEN SATURATION: 100 %

## 2021-05-26 DIAGNOSIS — K86.1 OTHER CHRONIC PANCREATITIS: ICD-10-CM

## 2021-05-26 DIAGNOSIS — K85.82 OTHER ACUTE PANCREATITIS WITH INFECTED NECROSIS: ICD-10-CM

## 2021-05-26 DIAGNOSIS — R63.4 ABNORMAL WEIGHT LOSS: ICD-10-CM

## 2021-05-26 PROCEDURE — 99212 OFFICE O/P EST SF 10 MIN: CPT | Performed by: INTERNAL MEDICINE

## 2021-08-24 ENCOUNTER — OFFICE (OUTPATIENT)
Dept: URBAN - METROPOLITAN AREA CLINIC 14 | Facility: CLINIC | Age: 76
End: 2021-08-24

## 2021-08-24 VITALS
HEIGHT: 71 IN | SYSTOLIC BLOOD PRESSURE: 119 MMHG | HEART RATE: 89 BPM | DIASTOLIC BLOOD PRESSURE: 76 MMHG | RESPIRATION RATE: 18 BRPM | WEIGHT: 152 LBS

## 2021-08-24 DIAGNOSIS — K83.1 OBSTRUCTION OF BILE DUCT: ICD-10-CM

## 2021-08-24 DIAGNOSIS — R93.3 ABNORMAL FINDINGS ON DIAGNOSTIC IMAGING OF OTHER PARTS OF DI: ICD-10-CM

## 2021-08-24 DIAGNOSIS — R19.09 OTHER INTRA-ABDOMINAL AND PELVIC SWELLING, MASS AND LUMP: ICD-10-CM

## 2021-08-24 DIAGNOSIS — R63.4 ABNORMAL WEIGHT LOSS: ICD-10-CM

## 2021-08-24 DIAGNOSIS — K85.82 OTHER ACUTE PANCREATITIS WITH INFECTED NECROSIS: ICD-10-CM

## 2021-08-24 PROCEDURE — 99214 OFFICE O/P EST MOD 30 MIN: CPT

## 2021-11-01 ENCOUNTER — ON CAMPUS - OUTPATIENT (OUTPATIENT)
Dept: URBAN - METROPOLITAN AREA HOSPITAL 97 | Facility: HOSPITAL | Age: 76
End: 2021-11-01

## 2021-11-01 DIAGNOSIS — R93.5 ABNORMAL FINDINGS ON DIAGNOSTIC IMAGING OF OTHER ABDOMINAL R: ICD-10-CM

## 2021-11-01 DIAGNOSIS — D12.8 BENIGN NEOPLASM OF RECTUM: ICD-10-CM

## 2021-11-01 DIAGNOSIS — K86.2 CYST OF PANCREAS: ICD-10-CM

## 2021-11-01 DIAGNOSIS — D12.2 BENIGN NEOPLASM OF ASCENDING COLON: ICD-10-CM

## 2021-11-01 DIAGNOSIS — D12.4 BENIGN NEOPLASM OF DESCENDING COLON: ICD-10-CM

## 2021-11-01 PROCEDURE — 43242 EGD US FINE NEEDLE BX/ASPIR: CPT | Performed by: INTERNAL MEDICINE

## 2021-11-01 PROCEDURE — 45380 COLONOSCOPY AND BIOPSY: CPT | Performed by: INTERNAL MEDICINE

## 2022-01-21 ENCOUNTER — OFFICE (OUTPATIENT)
Dept: URBAN - METROPOLITAN AREA CLINIC 19 | Facility: CLINIC | Age: 77
End: 2022-01-21

## 2022-01-21 VITALS
DIASTOLIC BLOOD PRESSURE: 88 MMHG | HEART RATE: 79 BPM | WEIGHT: 159 LBS | OXYGEN SATURATION: 97 % | HEIGHT: 71 IN | SYSTOLIC BLOOD PRESSURE: 141 MMHG

## 2022-01-21 DIAGNOSIS — R63.4 ABNORMAL WEIGHT LOSS: ICD-10-CM

## 2022-01-21 DIAGNOSIS — R19.09 OTHER INTRA-ABDOMINAL AND PELVIC SWELLING, MASS AND LUMP: ICD-10-CM

## 2022-01-21 DIAGNOSIS — K85.82 OTHER ACUTE PANCREATITIS WITH INFECTED NECROSIS: ICD-10-CM

## 2022-01-21 LAB
CA 19-9: 41 U/ML — HIGH (ref 0–35)
CBC WITH DIFFERENTIAL/PLATELET: BASO (ABSOLUTE): 0 X10E3/UL (ref 0–0.2)
CBC WITH DIFFERENTIAL/PLATELET: BASOS: 0 %
CBC WITH DIFFERENTIAL/PLATELET: EOS (ABSOLUTE): 0 X10E3/UL (ref 0–0.4)
CBC WITH DIFFERENTIAL/PLATELET: EOS: 0 %
CBC WITH DIFFERENTIAL/PLATELET: HEMATOCRIT: 39.5 % (ref 37.5–51)
CBC WITH DIFFERENTIAL/PLATELET: HEMOGLOBIN: 13.7 G/DL (ref 13–17.7)
CBC WITH DIFFERENTIAL/PLATELET: IMMATURE GRANS (ABS): 0 X10E3/UL (ref 0–0.1)
CBC WITH DIFFERENTIAL/PLATELET: IMMATURE GRANULOCYTES: 0 %
CBC WITH DIFFERENTIAL/PLATELET: LYMPHS (ABSOLUTE): 0.9 X10E3/UL (ref 0.7–3.1)
CBC WITH DIFFERENTIAL/PLATELET: LYMPHS: 20 %
CBC WITH DIFFERENTIAL/PLATELET: MCH: 34.3 PG — HIGH (ref 26.6–33)
CBC WITH DIFFERENTIAL/PLATELET: MCHC: 34.7 G/DL (ref 31.5–35.7)
CBC WITH DIFFERENTIAL/PLATELET: MCV: 99 FL — HIGH (ref 79–97)
CBC WITH DIFFERENTIAL/PLATELET: MONOCYTES(ABSOLUTE): 0.3 X10E3/UL (ref 0.1–0.9)
CBC WITH DIFFERENTIAL/PLATELET: MONOCYTES: 7 %
CBC WITH DIFFERENTIAL/PLATELET: NEUTROPHILS (ABSOLUTE): 3.3 X10E3/UL (ref 1.4–7)
CBC WITH DIFFERENTIAL/PLATELET: NEUTROPHILS: 73 %
CBC WITH DIFFERENTIAL/PLATELET: PLATELETS: 158 X10E3/UL (ref 150–450)
CBC WITH DIFFERENTIAL/PLATELET: RBC: 4 X10E6/UL — LOW (ref 4.14–5.8)
CBC WITH DIFFERENTIAL/PLATELET: RDW: 13.4 % (ref 11.6–15.4)
CBC WITH DIFFERENTIAL/PLATELET: WBC: 4.6 X10E3/UL (ref 3.4–10.8)
COMP. METABOLIC PANEL (14): A/G RATIO: 1.2 (ref 1.2–2.2)
COMP. METABOLIC PANEL (14): ALBUMIN: 3.7 G/DL (ref 3.7–4.7)
COMP. METABOLIC PANEL (14): ALKALINE PHOSPHATASE: 93 IU/L (ref 44–121)
COMP. METABOLIC PANEL (14): ALT (SGPT): 14 IU/L (ref 0–44)
COMP. METABOLIC PANEL (14): AST (SGOT): 17 IU/L (ref 0–40)
COMP. METABOLIC PANEL (14): BILIRUBIN, TOTAL: 0.4 MG/DL (ref 0–1.2)
COMP. METABOLIC PANEL (14): BUN/CREATININE RATIO: 15 (ref 10–24)
COMP. METABOLIC PANEL (14): BUN: 14 MG/DL (ref 8–27)
COMP. METABOLIC PANEL (14): CALCIUM: 8.8 MG/DL (ref 8.6–10.2)
COMP. METABOLIC PANEL (14): CARBON DIOXIDE, TOTAL: 21 MMOL/L (ref 20–29)
COMP. METABOLIC PANEL (14): CHLORIDE: 108 MMOL/L — HIGH (ref 96–106)
COMP. METABOLIC PANEL (14): CREATININE: 0.94 MG/DL (ref 0.76–1.27)
COMP. METABOLIC PANEL (14): EGFR IF AFRICN AM: 91 ML/MIN/1.73 (ref 59–?)
COMP. METABOLIC PANEL (14): EGFR IF NONAFRICN AM: 79 ML/MIN/1.73 (ref 59–?)
COMP. METABOLIC PANEL (14): GLOBULIN, TOTAL: 3 G/DL (ref 1.5–4.5)
COMP. METABOLIC PANEL (14): GLUCOSE: 114 MG/DL — HIGH (ref 65–99)
COMP. METABOLIC PANEL (14): POTASSIUM: 4.4 MMOL/L (ref 3.5–5.2)
COMP. METABOLIC PANEL (14): PROTEIN, TOTAL: 6.7 G/DL (ref 6–8.5)
COMP. METABOLIC PANEL (14): SODIUM: 143 MMOL/L (ref 134–144)

## 2022-01-21 PROCEDURE — 99213 OFFICE O/P EST LOW 20 MIN: CPT | Performed by: INTERNAL MEDICINE

## 2022-01-21 RX ORDER — AZATHIOPRINE 50 1/1
TABLET ORAL
Qty: 180 | Refills: 0 | Status: COMPLETED
End: 2023-11-15

## 2022-01-21 NOTE — SERVICEHPINOTES
Mr. Mcnally is a 76-year-old male here for follow-up for a pancreatic mass, history of autoimmune pancreatitis, and weight loss. The patient underwent an EUS/colonoscopy performed 11/01/2021 which revealed a multi lobe delayed cystic lesion on the head of the pancreas as well as changes consistent with chronic pancreatitis, pancreatic tail mass, as well as multiple colonic polyps. Pathology was unremarkable. The patient was recently seen at the [facility] 11/21/2021. He does have a history of bladder cancer. A CT scan was performed which revealed the pancreas abnormality as well as colitis. It was also noted the patient did have right axillary adenopathy. The patient had biopsies performed that were inconclusive. Overall, the patient states that he is feeling well. His weight has stabilized. He denies any abdominal pain at this time. He does report having an increase in flatulence. He denies any melena, or hematochezia.

## 2022-01-21 NOTE — SERVICENOTES
Recent biopsy on auxiliary lymph nodes-inconclusive. Seen at [facility] November

seen and examined; agree with interval h&p.  pt clinically stable without new complaints aside from flatulence. abd exam benign.  plan: sample of creon to see if helps gas.  check labs and f/u imaging

## 2022-06-01 ENCOUNTER — OFFICE (OUTPATIENT)
Dept: URBAN - METROPOLITAN AREA CLINIC 19 | Facility: CLINIC | Age: 77
End: 2022-06-01

## 2022-06-01 VITALS
HEART RATE: 97 BPM | DIASTOLIC BLOOD PRESSURE: 71 MMHG | OXYGEN SATURATION: 98 % | HEIGHT: 71 IN | WEIGHT: 146.8 LBS | SYSTOLIC BLOOD PRESSURE: 116 MMHG

## 2022-06-01 DIAGNOSIS — K85.82 OTHER ACUTE PANCREATITIS WITH INFECTED NECROSIS: ICD-10-CM

## 2022-06-01 DIAGNOSIS — K86.1 OTHER CHRONIC PANCREATITIS: ICD-10-CM

## 2022-06-01 DIAGNOSIS — R63.4 ABNORMAL WEIGHT LOSS: ICD-10-CM

## 2022-06-01 PROCEDURE — 99213 OFFICE O/P EST LOW 20 MIN: CPT | Performed by: INTERNAL MEDICINE

## 2022-06-01 RX ORDER — PANCRELIPASE 36000; 180000; 114000 [USP'U]/1; [USP'U]/1; [USP'U]/1
CAPSULE, DELAYED RELEASE PELLETS ORAL
Qty: 90 | Refills: 6 | Status: ACTIVE
Start: 2022-06-01

## 2022-06-01 RX ORDER — AZATHIOPRINE 50 1/1
TABLET ORAL
Qty: 180 | Refills: 0 | Status: COMPLETED
End: 2023-11-15

## 2022-09-14 ENCOUNTER — OFFICE (OUTPATIENT)
Dept: URBAN - METROPOLITAN AREA CLINIC 19 | Facility: CLINIC | Age: 77
End: 2022-09-14

## 2022-09-14 VITALS
DIASTOLIC BLOOD PRESSURE: 95 MMHG | OXYGEN SATURATION: 100 % | SYSTOLIC BLOOD PRESSURE: 159 MMHG | HEART RATE: 78 BPM | WEIGHT: 151 LBS | HEIGHT: 71 IN

## 2022-09-14 DIAGNOSIS — R10.9 UNSPECIFIED ABDOMINAL PAIN: ICD-10-CM

## 2022-09-14 DIAGNOSIS — K86.1 OTHER CHRONIC PANCREATITIS: ICD-10-CM

## 2022-09-14 DIAGNOSIS — K85.82 OTHER ACUTE PANCREATITIS WITH INFECTED NECROSIS: ICD-10-CM

## 2022-09-14 PROCEDURE — 99212 OFFICE O/P EST SF 10 MIN: CPT | Performed by: INTERNAL MEDICINE

## 2023-04-20 ENCOUNTER — OFFICE (OUTPATIENT)
Dept: URBAN - METROPOLITAN AREA CLINIC 19 | Facility: CLINIC | Age: 78
End: 2023-04-20

## 2023-04-20 VITALS
OXYGEN SATURATION: 98 % | WEIGHT: 150 LBS | HEIGHT: 71 IN | DIASTOLIC BLOOD PRESSURE: 84 MMHG | HEART RATE: 90 BPM | SYSTOLIC BLOOD PRESSURE: 124 MMHG

## 2023-04-20 DIAGNOSIS — R10.9 UNSPECIFIED ABDOMINAL PAIN: ICD-10-CM

## 2023-04-20 DIAGNOSIS — K57.30 DIVERTICULOSIS OF LARGE INTESTINE WITHOUT PERFORATION OR ABS: ICD-10-CM

## 2023-04-20 PROCEDURE — 99213 OFFICE O/P EST LOW 20 MIN: CPT | Performed by: INTERNAL MEDICINE

## 2023-04-20 RX ORDER — AZATHIOPRINE 50 1/1
TABLET ORAL
Qty: 270 | Refills: 4 | Status: ACTIVE

## 2023-06-28 ENCOUNTER — OFFICE (OUTPATIENT)
Dept: URBAN - METROPOLITAN AREA CLINIC 19 | Facility: CLINIC | Age: 78
End: 2023-06-28

## 2023-06-28 VITALS
OXYGEN SATURATION: 99 % | HEART RATE: 82 BPM | DIASTOLIC BLOOD PRESSURE: 76 MMHG | WEIGHT: 148 LBS | HEIGHT: 71 IN | SYSTOLIC BLOOD PRESSURE: 128 MMHG

## 2023-06-28 DIAGNOSIS — K85.82 OTHER ACUTE PANCREATITIS WITH INFECTED NECROSIS: ICD-10-CM

## 2023-06-28 DIAGNOSIS — R63.4 ABNORMAL WEIGHT LOSS: ICD-10-CM

## 2023-06-28 PROCEDURE — 99214 OFFICE O/P EST MOD 30 MIN: CPT | Performed by: INTERNAL MEDICINE

## 2023-06-28 RX ORDER — PANCRELIPASE LIPASE, PANCRELIPASE PROTEASE, PANCRELIPASE AMYLASE 40000; 126000; 168000 [USP'U]/1; [USP'U]/1; [USP'U]/1
CAPSULE, DELAYED RELEASE ORAL
Qty: 270 | Refills: 3 | Status: ACTIVE
Start: 2023-06-28

## 2023-06-29 LAB
CA 19-9: 43 U/ML — HIGH (ref 0–35)
CBC WITH DIFFERENTIAL/PLATELET: BASO (ABSOLUTE): 0 X10E3/UL (ref 0–0.2)
CBC WITH DIFFERENTIAL/PLATELET: BASOS: 1 %
CBC WITH DIFFERENTIAL/PLATELET: EOS (ABSOLUTE): 0 X10E3/UL (ref 0–0.4)
CBC WITH DIFFERENTIAL/PLATELET: EOS: 1 %
CBC WITH DIFFERENTIAL/PLATELET: HEMATOCRIT: 41.3 % (ref 37.5–51)
CBC WITH DIFFERENTIAL/PLATELET: HEMOGLOBIN: 14.1 G/DL (ref 13–17.7)
CBC WITH DIFFERENTIAL/PLATELET: IMMATURE GRANS (ABS): 0 X10E3/UL (ref 0–0.1)
CBC WITH DIFFERENTIAL/PLATELET: IMMATURE GRANULOCYTES: 0 %
CBC WITH DIFFERENTIAL/PLATELET: LYMPHS (ABSOLUTE): 1 X10E3/UL (ref 0.7–3.1)
CBC WITH DIFFERENTIAL/PLATELET: LYMPHS: 24 %
CBC WITH DIFFERENTIAL/PLATELET: MCH: 35 PG — HIGH (ref 26.6–33)
CBC WITH DIFFERENTIAL/PLATELET: MCHC: 34.1 G/DL (ref 31.5–35.7)
CBC WITH DIFFERENTIAL/PLATELET: MCV: 103 FL — HIGH (ref 79–97)
CBC WITH DIFFERENTIAL/PLATELET: MONOCYTES(ABSOLUTE): 0.3 X10E3/UL (ref 0.1–0.9)
CBC WITH DIFFERENTIAL/PLATELET: MONOCYTES: 8 %
CBC WITH DIFFERENTIAL/PLATELET: NEUTROPHILS (ABSOLUTE): 2.6 X10E3/UL (ref 1.4–7)
CBC WITH DIFFERENTIAL/PLATELET: NEUTROPHILS: 66 %
CBC WITH DIFFERENTIAL/PLATELET: PLATELETS: 161 X10E3/UL (ref 150–450)
CBC WITH DIFFERENTIAL/PLATELET: RBC: 4.03 X10E6/UL — LOW (ref 4.14–5.8)
CBC WITH DIFFERENTIAL/PLATELET: RDW: 13.9 % (ref 11.6–15.4)
CBC WITH DIFFERENTIAL/PLATELET: WBC: 4 X10E3/UL (ref 3.4–10.8)
COMP. METABOLIC PANEL (14): A/G RATIO: 1.3 (ref 1.2–2.2)
COMP. METABOLIC PANEL (14): ALBUMIN: 3.6 G/DL — LOW (ref 3.7–4.7)
COMP. METABOLIC PANEL (14): ALKALINE PHOSPHATASE: 97 IU/L (ref 44–121)
COMP. METABOLIC PANEL (14): ALT (SGPT): 16 IU/L (ref 0–44)
COMP. METABOLIC PANEL (14): AST (SGOT): 16 IU/L (ref 0–40)
COMP. METABOLIC PANEL (14): BILIRUBIN, TOTAL: 0.5 MG/DL (ref 0–1.2)
COMP. METABOLIC PANEL (14): BUN/CREATININE RATIO: 15 (ref 10–24)
COMP. METABOLIC PANEL (14): BUN: 13 MG/DL (ref 8–27)
COMP. METABOLIC PANEL (14): CALCIUM: 8.9 MG/DL (ref 8.6–10.2)
COMP. METABOLIC PANEL (14): CARBON DIOXIDE, TOTAL: 23 MMOL/L (ref 20–29)
COMP. METABOLIC PANEL (14): CHLORIDE: 108 MMOL/L — HIGH (ref 96–106)
COMP. METABOLIC PANEL (14): CREATININE: 0.84 MG/DL (ref 0.76–1.27)
COMP. METABOLIC PANEL (14): EGFR: 90 ML/MIN/1.73 (ref 59–?)
COMP. METABOLIC PANEL (14): GLOBULIN, TOTAL: 2.8 G/DL (ref 1.5–4.5)
COMP. METABOLIC PANEL (14): GLUCOSE: 61 MG/DL — LOW (ref 70–99)
COMP. METABOLIC PANEL (14): POTASSIUM: 4.3 MMOL/L (ref 3.5–5.2)
COMP. METABOLIC PANEL (14): PROTEIN, TOTAL: 6.4 G/DL (ref 6–8.5)
COMP. METABOLIC PANEL (14): SODIUM: 143 MMOL/L (ref 134–144)
TSH: 0.61 UIU/ML (ref 0.45–4.5)

## 2023-11-15 ENCOUNTER — OFFICE (OUTPATIENT)
Dept: URBAN - METROPOLITAN AREA CLINIC 19 | Facility: CLINIC | Age: 78
End: 2023-11-15

## 2023-11-15 VITALS
HEART RATE: 84 BPM | SYSTOLIC BLOOD PRESSURE: 143 MMHG | HEIGHT: 71 IN | DIASTOLIC BLOOD PRESSURE: 79 MMHG | WEIGHT: 151 LBS | OXYGEN SATURATION: 99 %

## 2023-11-15 DIAGNOSIS — K85.82 OTHER ACUTE PANCREATITIS WITH INFECTED NECROSIS: ICD-10-CM

## 2023-11-15 PROCEDURE — 99212 OFFICE O/P EST SF 10 MIN: CPT | Performed by: INTERNAL MEDICINE

## 2023-11-15 RX ORDER — PANCRELIPASE LIPASE, PANCRELIPASE PROTEASE, PANCRELIPASE AMYLASE 40000; 126000; 168000 [USP'U]/1; [USP'U]/1; [USP'U]/1
CAPSULE, DELAYED RELEASE ORAL
Qty: 270 | Refills: 3 | Status: ACTIVE
Start: 2023-06-28

## 2023-11-15 RX ORDER — AZATHIOPRINE 50 1/1
TABLET ORAL
Qty: 270 | Refills: 4 | Status: ACTIVE

## 2024-05-15 ENCOUNTER — OFFICE (OUTPATIENT)
Dept: URBAN - METROPOLITAN AREA CLINIC 19 | Facility: CLINIC | Age: 79
End: 2024-05-15
Payer: COMMERCIAL

## 2024-05-15 VITALS
HEART RATE: 84 BPM | OXYGEN SATURATION: 100 % | HEIGHT: 71 IN | DIASTOLIC BLOOD PRESSURE: 68 MMHG | SYSTOLIC BLOOD PRESSURE: 121 MMHG | WEIGHT: 151 LBS

## 2024-05-15 DIAGNOSIS — K85.82 OTHER ACUTE PANCREATITIS WITH INFECTED NECROSIS: ICD-10-CM

## 2024-05-15 DIAGNOSIS — R97.8 OTHER ABNORMAL TUMOR MARKERS: ICD-10-CM

## 2024-05-15 PROCEDURE — 99213 OFFICE O/P EST LOW 20 MIN: CPT | Performed by: INTERNAL MEDICINE

## 2024-05-15 RX ORDER — PANCRELIPASE LIPASE, PANCRELIPASE PROTEASE, PANCRELIPASE AMYLASE 40000; 126000; 168000 [USP'U]/1; [USP'U]/1; [USP'U]/1
CAPSULE, DELAYED RELEASE ORAL
Qty: 270 | Refills: 3 | Status: ACTIVE
Start: 2023-06-28

## 2024-05-15 RX ORDER — AZATHIOPRINE 50 1/1
TABLET ORAL
Qty: 270 | Refills: 4 | Status: ACTIVE

## 2025-01-29 ENCOUNTER — OFFICE (OUTPATIENT)
Dept: URBAN - METROPOLITAN AREA CLINIC 11 | Facility: CLINIC | Age: 80
End: 2025-01-29
Payer: MEDICARE

## 2025-01-29 VITALS
HEART RATE: 90 BPM | DIASTOLIC BLOOD PRESSURE: 82 MMHG | WEIGHT: 141 LBS | OXYGEN SATURATION: 98 % | SYSTOLIC BLOOD PRESSURE: 131 MMHG | HEIGHT: 71 IN

## 2025-01-29 DIAGNOSIS — K86.1 OTHER CHRONIC PANCREATITIS: ICD-10-CM

## 2025-01-29 DIAGNOSIS — R63.4 ABNORMAL WEIGHT LOSS: ICD-10-CM

## 2025-01-29 DIAGNOSIS — Z86.0100 PERSONAL HISTORY OF COLON POLYPS, UNSPECIFIED: ICD-10-CM

## 2025-01-29 DIAGNOSIS — K85.82 OTHER ACUTE PANCREATITIS WITH INFECTED NECROSIS: ICD-10-CM

## 2025-01-29 DIAGNOSIS — R97.8 OTHER ABNORMAL TUMOR MARKERS: ICD-10-CM

## 2025-01-29 PROCEDURE — 99215 OFFICE O/P EST HI 40 MIN: CPT

## 2025-01-29 RX ORDER — SODIUM SULFATE, POTASSIUM SULFATE, MAGNESIUM SULFATE 1.6; 3.13; 17.5 G/ML; G/ML; G/ML
SOLUTION, CONCENTRATE ORAL
Qty: 1 | Refills: 0 | Status: ACTIVE
Start: 2025-01-29